# Patient Record
Sex: FEMALE | Race: WHITE | NOT HISPANIC OR LATINO
[De-identification: names, ages, dates, MRNs, and addresses within clinical notes are randomized per-mention and may not be internally consistent; named-entity substitution may affect disease eponyms.]

---

## 2020-07-17 ENCOUNTER — TRANSCRIBE ORDERS (OUTPATIENT)
Dept: OTHER | Age: 40
End: 2020-07-17

## 2020-07-17 DIAGNOSIS — H30.143: Primary | ICD-10-CM

## 2020-07-20 ENCOUNTER — TELEPHONE (OUTPATIENT)
Dept: OPHTHALMOLOGY | Facility: CLINIC | Age: 40
End: 2020-07-20

## 2020-07-20 NOTE — TELEPHONE ENCOUNTER
[facility] Call Center    Phone Message    May a detailed message be left on voicemail: yes     Reason for Call: Appointment Intake    Referring Provider Name:   Dave De La Fuente from [place] referring to Dr. 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 Not sure what visit type to schedule this under.    Action Taken: Other: Eye Clinic    Travel Screening: Not Applicable

## 2020-08-25 ENCOUNTER — OFFICE VISIT (OUTPATIENT)
Dept: OPHTHALMOLOGY | Facility: CLINIC | Age: 40
End: 2020-08-25
Attending: OPHTHALMOLOGY
Payer: MEDICARE

## 2020-08-25 DIAGNOSIS — H35.363 DRUSEN (DEGENERATIVE) OF MACULA, BILATERAL: ICD-10-CM

## 2020-08-25 DIAGNOSIS — H53.40 VFD (VISUAL FIELD DEFECT): ICD-10-CM

## 2020-08-25 DIAGNOSIS — Z79.899 LONG-TERM USE OF PLAQUENIL: Primary | ICD-10-CM

## 2020-08-25 DIAGNOSIS — H30.143: Primary | ICD-10-CM

## 2020-08-25 PROCEDURE — 92134 CPTRZ OPH DX IMG PST SGM RTA: CPT | Mod: ZF | Performed by: OPHTHALMOLOGY

## 2020-08-25 PROCEDURE — G0463 HOSPITAL OUTPT CLINIC VISIT: HCPCS | Mod: ZF

## 2020-08-25 PROCEDURE — 92250 FUNDUS PHOTOGRAPHY W/I&R: CPT | Mod: ZF | Performed by: OPHTHALMOLOGY

## 2020-08-25 RX ORDER — FLUOXETINE 10 MG/1
10 CAPSULE ORAL DAILY
COMMUNITY
Start: 2020-05-04

## 2020-08-25 RX ORDER — DAPSONE 100 MG/1
100 TABLET ORAL DAILY
COMMUNITY
Start: 2020-08-21

## 2020-08-25 RX ORDER — CETIRIZINE HYDROCHLORIDE 10 MG/1
10 TABLET ORAL DAILY
COMMUNITY

## 2020-08-25 RX ORDER — ACETAMINOPHEN 325 MG/1
650 TABLET ORAL EVERY 6 HOURS PRN
COMMUNITY

## 2020-08-25 RX ORDER — FEXOFENADINE HCL 180 MG/1
180 TABLET ORAL DAILY
COMMUNITY

## 2020-08-25 RX ORDER — LAMOTRIGINE 200 MG/1
200 TABLET ORAL DAILY
COMMUNITY
Start: 2020-05-04 | End: 2021-05-09

## 2020-08-25 RX ORDER — HYDROXYCHLOROQUINE SULFATE 200 MG/1
300 TABLET, FILM COATED ORAL DAILY
COMMUNITY
Start: 2020-03-26

## 2020-08-25 RX ORDER — FAMOTIDINE 20 MG/1
2 TABLET, FILM COATED ORAL 2 TIMES DAILY
COMMUNITY
Start: 2020-07-13

## 2020-08-25 RX ORDER — GABAPENTIN 100 MG/1
100 CAPSULE ORAL 3 TIMES DAILY
COMMUNITY
Start: 2020-07-13

## 2020-08-25 RX ORDER — MYCOPHENOLATE MOFETIL 500 MG/1
1000 TABLET ORAL DAILY
COMMUNITY
Start: 2020-03-31

## 2020-08-25 RX ORDER — QUETIAPINE FUMARATE 50 MG/1
50 TABLET, FILM COATED ORAL DAILY
COMMUNITY
Start: 2020-05-04

## 2020-08-25 RX ORDER — PREDNISONE 5 MG/1
5 TABLET ORAL DAILY
COMMUNITY

## 2020-08-25 RX ORDER — DOXEPIN HYDROCHLORIDE 10 MG/1
2 CAPSULE ORAL AT BEDTIME
COMMUNITY
Start: 2020-05-04

## 2020-08-25 ASSESSMENT — EXTERNAL EXAM - RIGHT EYE: OD_EXAM: NORMAL

## 2020-08-25 ASSESSMENT — SLIT LAMP EXAM - LIDS
COMMENTS: NORMAL
COMMENTS: NORMAL

## 2020-08-25 ASSESSMENT — TONOMETRY
OS_IOP_MMHG: 18
OD_IOP_MMHG: 18
IOP_METHOD: TONOPEN

## 2020-08-25 ASSESSMENT — CUP TO DISC RATIO
OS_RATIO: 0.3
OD_RATIO: 0.3

## 2020-08-25 ASSESSMENT — CONF VISUAL FIELD
METHOD: COUNTING FINGERS
OS_NORMAL: 1
OD_NORMAL: 1

## 2020-08-25 ASSESSMENT — VISUAL ACUITY
OS_SC+: -1
OD_PH_SC: 20/20
OD_SC: 20/40
OS_SC: 20/20
METHOD: SNELLEN - LINEAR

## 2020-08-25 ASSESSMENT — EXTERNAL EXAM - LEFT EYE: OS_EXAM: NORMAL

## 2020-08-25 NOTE — LETTER
"8/25/2020      RE: Mary Ann Timmons  [address] [facility] [address]       CC -   APMPPE    INTERVAL HISTORY - Initial visit with me.   Worsening night vision, VA blurry, has \"black spots' OU, static  Started 5/2019, appeared one day and did not change since  OD notes in center and \"up and left\", OS notes \"up and left\"  Spots are small and track with vision    HPI -   Mary Ann Timmons is a 40 year old year-old patient referred by Dr. Dave De La Fuente for retinal abnormalities    Cellcept and plaquenil for SLE. Rheumatologist - Dr. Aec   Plaquenil since ~2006; decreased to 200mg/day 03/2020    Plaquenil 400/day 2006-3/2002, reduced to 200/day 3/2020  Weight 155#, height 5'11, no renal disease, no tamoxifen    Records review 8/2020 show \"hard drusen\" noted OU in 2017 & 2018 (You) and \"punched out\" mid-peripheral spots noted in 2019 & 2020 (Shayna)    PAST OCULAR SURGERY  None    RETINAL IMAGING:  OCT 08/25/20   OD - numerous SR/RPE deposits/drusen, retinal layers intact elsewhere, PHF attached  OS - numerous SR/RPE deposits/drusen, retinal layers intact elsewhere, PHF attached    FAF 8-25-20  OU - multiple small hyperAF spots macula & mid-periphery    ASSESSMENT & PLAN    1. Drusen OU   - numerous SR deposits OU, pattern dystrophy   - unclear if correspond to VFD, sees central VFD OD and has deposit OD on OCT   - stable symptoms since onset in 2019   - doubt related to plaquenil     2. Long-term use of Plaquenil   - 400 mg/day 7181-6932, 200/day since 1/2020   - OCT and FAF not typical for plaquenil   - VFD concerning nonetheless   - will have patient return for mfERG and OVF 10-2 and 24-2    3. VFD OU   - etiology   - will assess with mfERG, OVF 10-2 & OVF 24-2    return to clinic: for testing    Miguel Narayan MD  Ophthalmology PGY-3  [facility]     ATTESTATION     Attending Attestation:     Complete documentation of historical and exam elements from today's encounter can be found " "in the full encounter summary report (not reduplicated in this progress note).  I personally obtained the chief complaint(s) and history of present illness.  I confirmed and edited as necessary the review of systems, past medical/surgical history, family history, social history, and examination findings as documented by others; and I examined the patient myself.  I personally reviewed the relevant tests, images, and reports as documented above.  I personally reviewed the ophthalmic test(s) associated with this encounter, agree with the interpretation(s) as documented by the resident/fellow, and have edited the corresponding report(s) as necessary.   I formulated and edited as necessary the assessment and plan and discussed the findings and management plan with the patient and family    Joy Ruiz MD, PhD  , Vitreoretinal Surgery  Department of Ophthalmology  [facility]    Notes  Joy Ruiz MD (Physician)     Ophthalmology     Multifocal ERG and Visual Field Results      Diagnostic Indication:   Plaquenil use since 1006.  She has used 400 mg/day from 1306-1591, and 200 mg/day since 1/2020.  She has noticed subjective visual field defects in both eyes \"up and to the left\" of center in both eyes.     Diagnostic findings:      MfERG:  A 103 hexagon stimulus pattern was used to obtain a mfERG in both eyes. For  both  eyes, the test was noted to be reliable by the technician.  The waveform tracings were normal with good SNR.  The amplitude plot showed a well formed foveal peak.  The amplitude values were within normal limits or even supernormal. The latencies did not show any abnormal increase.  The ring tracings were essentially normal, though the central tracings had high amplitudes.  The ring ratios were essentially within normal limits for ring amplitudes.      Visual Fields: An OVF 10-2 and OVF 30-2 was obtained in both eyes.  Both fields were reliable.  The OVF " 10-2 was normal in both eyes.  The OVF 30-2 in the right eye showed a peripheral localized scotoma superonasal to the center.  The left eye showed a single shallow defect superonasal.       Diagnostic impression:    1.  Normal or even supernormal mfERG both eyes.    2. Normal central visual fields both eyes  3. 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  No evidence of  plaquenil toxicity.     Clinical recommendations: clinical correlation recommended.  The visual field defect noted OD does not correspond to any abnormalities on the mfERG.  Plaquenil damage can sometimes develop peripherally first, especially in some populations, but this visual field defect is not typical and there are no imaging findings to correspond.  Would advise follow up with neuro-ophthalmology as there does not seem to be glaucoma.              Sincerely,    Joy Ruiz MD, PhD  , Vitreoretinal Surgery  Department of Ophthalmology & Visual Neurosciences  [facility]

## 2020-08-25 NOTE — NURSING NOTE
Chief Complaints and History of Present Illnesses   Patient presents with     Retinal Evaluation     Eval for Acute posterior multifocal placoid pigment epitheliopathy, both eyes     Chief Complaint(s) and History of Present Illness(es)     Retinal Evaluation     Quality: blurred vision    Associated symptoms: eye pain.  Negative for dryness, tearing and discharge    Treatments tried: artificial tears    Pain scale: 0/10    Comments: Eval for Acute posterior multifocal placoid pigment epitheliopathy, both eyes              Comments     Pt complains of vision BE being slightly blurry.  Complains of having a lot of stationary black spots in vision BE.  Complains of having an episode of stabbing pain RE about a month ago- hasn't had any pain since.  Complains of BE feeling irritated due to seasonal allergies.  Denies any flashes, pressure, discharge, and tearing.  Ocular meds: AT's PRN BE    Allegra Cabrera OT 1:37 PM August 25, 2020

## 2020-08-25 NOTE — PROGRESS NOTES
"CC -   APMPPE    INTERVAL HISTORY - Initial visit with me.   Worsening night vision, VA blurry, has \"black spots' OU, static  Started 5/2019, appeared one day and did not change since  OD notes in center and \"up and left\", OS notes \"up and left\"  Spots are small and track with vision      HPI -   Mary Ann Timmons is a 40 year old year-old patient referred by Dr. Dave De La Fuente for retinal abnormalities    Cellcept and plaquenil for SLE. Rheumatologist - Dr. Ace   Plaquenil since ~2006; decreased to 200mg/day 03/2020    Plaquenil 400/day 2006-3/2002, reduced to 200/day 3/2020  Weight 155#, height 5'11, no renal disease, no tamoxifen    Records review 8/2020 show \"hard drusen\" noted OU in 2017 & 2018 (You) and \"punched out\" mid-peripheral spots noted in 2019 & 2020 (Shayna)      PAST OCULAR SURGERY  None    RETINAL IMAGING:  OCT 08/25/20   OD - numerous SR/RPE deposits/drusen, retinal layers intact elsewhere, PHF attached  OS - numerous SR/RPE deposits/drusen, retinal layers intact elsewhere, PHF attached    FAF 8-25-20  OU - multiple small hyperAF spots macula & mid-periphery        ASSESSMENT & PLAN    1. Drusen OU   - numerous SR deposits OU, ?pattern dystrophy   - unclear if correspond to VFD, sees central VFD OD and has deposit OD on OCT   - stable symptoms since onset in 2019   - doubt related to plaquenil       2. Long-term use of Plaquenil   - 400 mg/day 7337-5938, 200/day since 1/2020   - OCT and FAF not typical for plaquenil   - VFD concerning nonetheless   - will have patient return for mfERG and OVF 10-2 and 24-2      3. VFD OU   - ?etiology   - ?will assess with mfERG, OVF 10-2 & OVF 24-2            return to clinic: for testing    Miguel Narayan MD  Ophthalmology PGY-3  [facility]       ATTESTATION     Attending Attestation:     Complete documentation of historical and exam elements from today's encounter can be found in the full encounter summary report (not reduplicated in " this progress note).  I personally obtained the chief complaint(s) and history of present illness.  I confirmed and edited as necessary the review of systems, past medical/surgical history, family history, social history, and examination findings as documented by others; and I examined the patient myself.  I personally reviewed the relevant tests, images, and reports as documented above.  I personally reviewed the ophthalmic test(s) associated with this encounter, agree with the interpretation(s) as documented by the resident/fellow, and have edited the corresponding report(s) as necessary.   I formulated and edited as necessary the assessment and plan and discussed the findings and management plan with the patient and family    Joy Ruiz MD, PhD  , Vitreoretinal Surgery  Department of Ophthalmology  [facility]

## 2020-08-26 ENCOUNTER — TELEPHONE (OUTPATIENT)
Dept: OPHTHALMOLOGY | Facility: CLINIC | Age: 40
End: 2020-08-26

## 2020-09-24 ENCOUNTER — ALLIED HEALTH/NURSE VISIT (OUTPATIENT)
Dept: OPHTHALMOLOGY | Facility: CLINIC | Age: 40
End: 2020-09-24
Attending: OPHTHALMOLOGY
Payer: MEDICARE

## 2020-09-24 DIAGNOSIS — H35.363 DRUSEN (DEGENERATIVE) OF MACULA, BILATERAL: ICD-10-CM

## 2020-09-24 PROCEDURE — 40000269 ZZH STATISTIC NO CHARGE FACILITY FEE: Mod: ZF

## 2020-09-24 PROCEDURE — 92082 INTERMEDIATE VISUAL FIELD XM: CPT | Mod: ZF

## 2020-09-24 PROCEDURE — 92083 EXTENDED VISUAL FIELD XM: CPT | Mod: ZF

## 2020-09-24 PROCEDURE — 92274 MULTIFOCAL ERG W/I&R: CPT | Mod: ZF

## 2020-09-24 ASSESSMENT — VISUAL ACUITY
METHOD: SNELLEN - LINEAR
OD_PH_SC: 20/20
OD_SC: 20/40
OS_SC: 20/20

## 2020-09-24 NOTE — LETTER
"9/24/2020      RE: Mary Ann Timmons  [address] [facility] [address]       Multifocal ERG and Visual Field Results     Diagnostic Indication:   Plaquenil use since 1006.  She has used 400 mg/day from 9710-1553, and 200 mg/day since 1/2020.  She has noticed subjective visual field defects in both eyes \"up and to the left\" of center in both eyes.    Diagnostic findings:     MfERG:  A 103 hexagon stimulus pattern was used to obtain a mfERG in both eyes. For  both  eyes, the test was noted to be reliable by the technician.  The waveform tracings were normal with good SNR.  The amplitude plot showed a well formed foveal peak.  The amplitude values were within normal limits or even supernormal. The latencies did not show any abnormal increase.  The ring tracings were essentially normal, though the central tracings had high amplitudes.  The ring ratios were essentially within normal limits for ring amplitudes.     Visual Fields: An OVF 10-2 and OVF 30-2 was obtained in both eyes.  Both fields were reliable.  The OVF 10-2 was normal in both eyes.  The OVF 30-2 in the right eye showed a peripheral localized scotoma superonasal to the center.  The left eye showed a single shallow defect superonasal.      Diagnostic impression:    1.  Normal or even supernormal mfERG both eyes.    2. Normal central visual fields both eyes  3. 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  No evidence of  plaquenil toxicity.    Clinical recommendations: clinical correlation recommended.  The visual field defect noted OD does not correspond to any abnormalities on the mfERG.  Plaquenil damage can sometimes develop peripherally first, especially in some populations, but this visual field defect is not typical and there are no imaging findings to correspond.  Would advise follow up with neuro-ophthalmology as there does not seem to be glaucoma.    "

## 2020-09-24 NOTE — NURSING NOTE
"Chief Complaints and History of Present Illnesses   Patient presents with     Procedure     Chief Complaint(s) and History of Present Illness(es)     Procedure               Comments     mfERG for plaquenil monitoring  Started plaquenil 2006 400mg/day till 3/2020. Reduced to 300 mg/day 3/2020-present  Height 5'10\", weight 155lbs  Gabi Hughes COA 3:28 PM September 24, 2020                   "

## 2020-10-13 ENCOUNTER — TELEPHONE (OUTPATIENT)
Dept: OPHTHALMOLOGY | Facility: CLINIC | Age: 40
End: 2020-10-13

## 2020-10-13 NOTE — PROGRESS NOTES
"Multifocal ERG and Visual Field Results     Diagnostic Indication:   Plaquenil use since 1006.  She has used 400 mg/day from 6801-3951, and 200 mg/day since 1/2020.  She has noticed subjective visual field defects in both eyes \"up and to the left\" of center in both eyes.    Diagnostic findings:     MfERG:  A 103 hexagon stimulus pattern was used to obtain a mfERG in both eyes. For  both  eyes, the test was noted to be reliable by the technician.  The waveform tracings were normal with good SNR.  The amplitude plot showed a well formed foveal peak.  The amplitude values were within normal limits or even supernormal. The latencies did not show any abnormal increase.  The ring tracings were essentially normal, though the central tracings had high amplitudes.  The ring ratios were essentially within normal limits for ring amplitudes.     Visual Fields: An OVF 10-2 and OVF 30-2 was obtained in both eyes.  Both fields were reliable.  The OVF 10-2 was normal in both eyes.  The OVF 30-2 in the right eye showed a peripheral localized scotoma superonasal to the center.  The left eye showed a single shallow defect superonasal.      Diagnostic impression:    1.  Normal or even supernormal mfERG both eyes.    2. Normal central visual fields both eyes  3. 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  No evidence of  plaquenil toxicity.    Clinical recommendations: clinical correlation recommended.  The visual field defect noted OD does not correspond to any abnormalities on the mfERG.  Plaquenil damage can sometimes develop peripherally first, especially in some populations, but this visual field defect is not typical and there are no imaging findings to correspond.  Would advise follow up with neuro-ophthalmology as there does not seem to be glaucoma.      "

## 2020-10-13 NOTE — TELEPHONE ENCOUNTER
Letter sent to Sudhakar and Dr Ethan Bustillos, [facility], for appt. Also left message at [facility] office to call pt and set up.   Saniya Carr, COT COT 1:28 PM October 13, 2020

## 2020-10-24 NOTE — LETTER
"8/25/2020       RE: Mary Ann Timmons  [address] [facility] [address]     Dear Colleague,    Thank you for referring your patient, Mary Ann Timmons, to the EYE CLINIC at [facility]. Please see a copy of my visit note below.    CC -   APMPPE    INTERVAL HISTORY - Initial visit with me.   Worsening night vision, VA blurry, has \"black spots' OU, static  Started 5/2019, appeared one day and did not change since  OD notes in center and \"up and left\", OS notes \"up and left\"  Spots are small and track with vision      HPI -   Mary Ann Timmons is a 40 year old year-old patient referred by Dr. Dave De La Fuente for retinal abnormalities    Cellcept and plaquenil for SLE. Rheumatologist - Dr. Ace   Plaquenil since ~2006; decreased to 200mg/day 03/2020    Plaquenil 400/day 2006-3/2002, reduced to 200/day 3/2020  Weight 155#, height 5'11, no renal disease, no tamoxifen    Records review 8/2020 show \"hard drusen\" noted OU in 2017 & 2018 (You) and \"punched out\" mid-peripheral spots noted in 2019 & 2020 (Shayna)      PAST OCULAR SURGERY  None    RETINAL IMAGING:  OCT 08/25/20   OD - numerous SR/RPE deposits/drusen, retinal layers intact elsewhere, PHF attached  OS - numerous SR/RPE deposits/drusen, retinal layers intact elsewhere, PHF attached    FAF 8-25-20  OU - multiple small hyperAF spots macula & mid-periphery        ASSESSMENT & PLAN    1. Drusen OU   - numerous SR deposits OU, ?pattern dystrophy   - unclear if correspond to VFD, sees central VFD OD and has deposit OD on OCT   - stable symptoms since onset in 2019   - doubt related to plaquenil       2. Long-term use of Plaquenil   - 400 mg/day 6743-2056, 200/day since 1/2020   - OCT and FAF not typical for plaquenil   - VFD concerning nonetheless   - will have patient return for mfERG and OVF 10-2 and 24-2      3. VFD OU   - ?etiology   - ?will assess with mfERG, OVF 10-2 & OVF 24-2    return to clinic: for testing    Miguel" MD Sylvain  Ophthalmology PGY-3  [facility]       ATTESTATION   Attending Attestation:  Complete documentation of historical and exam elements from today's encounter can be found in the full encounter summary report (not reduplicated in this progress note).  I personally obtained the chief complaint(s) and history of present illness.  I confirmed and edited as necessary the review of systems, past medical/surgical history, family history, social history, and examination findings as documented by others; and I examined the patient myself.  I personally reviewed the relevant tests, images, and reports as documented above.  I personally reviewed the ophthalmic test(s) associated with this encounter, agree with the interpretation(s) as documented by the resident/fellow, and have edited the corresponding report(s) as necessary.   I formulated and edited as necessary the assessment and plan and discussed the findings and management plan with the patient and family. Joy Ruiz MD, PhD          Base Eye Exam     Visual Acuity (Snellen - Linear)       Right Left    Dist sc 20/40 20/20 -1    Dist ph sc 20/20           Tonometry (Tonopen, 1:47 PM)       Right Left    Pressure 18 18          Pupils       Dark Light Shape React APD    Right 7 6 Round Brisk None    Left 7 6 Round Brisk None          Visual Fields (Counting fingers)       Left Right     Full Full          Extraocular Movement       Right Left     Full Full          Neuro/Psych     Oriented x3:  Yes    Mood/Affect:  Normal          Dilation     Both eyes:  1.0% Mydriacyl, 2.5% René Synephrine @ 1:49 PM            Slit Lamp and Fundus Exam     External Exam       Right Left    External Normal Normal          Slit Lamp Exam       Right Left    Lids/Lashes Normal Normal    Conjunctiva/Sclera White and quiet White and quiet    Cornea Clear Clear    Anterior Chamber Deep and quiet Deep and quiet    Iris Dilated Dilated    Lens clear phakic clear  phakic    Vitreous syneresis, AV clear syneresis, AV clear          Fundus Exam       Right Left    Disc Normal Normal    C/D Ratio 0.3 0.3    Macula 1+ central RPE irreg vs fine drusen, multiple white SR deposits extrfoveal 1+ central RPE irreg vs fine drusen, multiple white SR deposits extrfoveal    Vessels Normal Normal    Periphery Normal Normal                Again, thank you for allowing me to participate in the care of your patient.      Sincerely,    Joy Ruiz MD, PhD  , Vitreoretinal Surgery  Department of Ophthalmology & Visual Neurosciences  [facility]     no

## 2020-12-04 ENCOUNTER — TRANSFERRED RECORDS (OUTPATIENT)
Dept: HEALTH INFORMATION MANAGEMENT | Facility: CLINIC | Age: 40
End: 2020-12-04

## 2021-01-04 ENCOUNTER — HEALTH MAINTENANCE LETTER (OUTPATIENT)
Age: 41
End: 2021-01-04

## 2021-10-11 ENCOUNTER — HEALTH MAINTENANCE LETTER (OUTPATIENT)
Age: 41
End: 2021-10-11

## 2022-01-30 ENCOUNTER — HEALTH MAINTENANCE LETTER (OUTPATIENT)
Age: 42
End: 2022-01-30

## 2022-09-24 ENCOUNTER — HEALTH MAINTENANCE LETTER (OUTPATIENT)
Age: 42
End: 2022-09-24

## 2023-05-08 ENCOUNTER — HEALTH MAINTENANCE LETTER (OUTPATIENT)
Age: 43
End: 2023-05-08

## 2024-02-25 ENCOUNTER — HEALTH MAINTENANCE LETTER (OUTPATIENT)
Age: 44
End: 2024-02-25